# Patient Record
(demographics unavailable — no encounter records)

---

## 2017-07-16 NOTE — EDM.PDOC
ED HPI GENERAL MEDICAL PROBLEM





- General


Chief Complaint: Genitourinary Problem


Stated Complaint: BLOOD IN URNINE


Time Seen by Provider: 07/16/17 18:03


Source of Information: Reports: Patient


History Limitations: Reports: No Limitations





- History of Present Illness


INITIAL COMMENTS - FREE TEXT/NARRATIVE: 





64-year-old female presents for evaluation and treatment of dysuria, increased 

urinary frequency and hematuria. Patient reports that her symptoms started 

around noon today. She states that she gets urinary tract infections almost 

yearly. This feels similar to previous urinary tract infections. She reports 

around 2 PM she developed gross hematuria. She states that she is feeling fine 

otherwise. No fevers, chills, nausea, vomiting, back pain or flank pain. She is 

reporting some cramping in the suprapubic area pubic region.





No history of kidney problems.





Patient is currently on amoxicillin twice a day for dental infection. She 

states she about 5 days of this medication left.


Onset: Today





- Related Data


 Allergies











Allergy/AdvReac Type Severity Reaction Status Date / Time


 


No Known Allergies Allergy   Verified 10/27/15 00:13











Home Meds: 


 Home Meds





Escitalopram [Lexapro] 20 mg PO DAILY 10/27/15 [History]


Amoxicillin 500 mg PO TID 07/16/17 [History]


Cyanocobalamin (Vitamin B-12) [Vitamin B12] 2,500 mcg PO DAILY 07/16/17 [History

]


Ergocalciferol (Vitamin D2) [Vitamin D2] 2,000 units PO DAILY 07/16/17 [History]


Ferrous Sulfate 325 mg PO DAILY 07/16/17 [History]


Omeprazole Magnesium [Prilosec Otc] 20 mg PO DAILY 07/16/17 [History]


Phenazopyridine [Pyridium] 100 mg PO TID #9 tablet 07/16/17 [Rx]











Past Medical History


Genitourinary History: Reports: UTI, Recurrent


Other Musculoskeletal History: lower back pain, lumbar stenosis


Psychiatric History: Reports: Depression


Hematologic History: Reports: B12 Deficiency, Iron Deficiency





- Past Surgical History


GI Surgical History: Reports: Bariatric Procedure





Social & Family History





- Family History


Family Medical History: Noncontributory





- Tobacco Use


Smoking Status *Q: Never Smoker


Second Hand Smoke Exposure: No





- Alcohol Use


Days Per Week of Alcohol Use: 0





- Recreational Drug Use


Recreational Drug Use: No





ED ROS GENERAL





- Review of Systems


Review Of Systems: See Below


Constitutional: Denies: Fever


GI/Abdominal: Denies: Nausea, Vomiting


: Reports: Dysuria, Frequency, Hematuria, Urgency.  Denies: Flank Pain


Musculoskeletal: Denies: Back Pain





ED EXAM, RENAL/





- Physical Exam


Exam: See Below


Exam Limited By: No Limitations


General Appearance: Alert, WD/WN, No Apparent Distress


Respiratory/Chest: No Respiratory Distress


Neurological: Alert, Oriented, Normal Cognition


Psychiatric: Normal Affect, Normal Mood


Skin Exam: Warm, Dry, Normal Color





Course





- Vital Signs


Last Recorded V/S: 


 Last Vital Signs











Temp  36.6 C   07/16/17 16:51


 


Pulse  72   07/16/17 16:51


 


Resp  16   07/16/17 16:51


 


BP  151/79 H  07/16/17 16:51


 


Pulse Ox  98   07/16/17 16:51














- Orders/Labs/Meds


Orders: 


 Active Orders 24 hr











 Category Date Time Status


 


 CULTURE URINE [RM] Stat Lab  07/16/17 17:05 Received











Labs: 


 Laboratory Tests











  07/16/17 Range/Units





  17:05 


 


Urine Color  Red H  (Yellow)  


 


Urine Appearance  Cloudy H  (Clear)  


 


Urine pH  5.5  (5.0-8.0)  


 


Ur Specific Gravity  > or = 1.030  (1.005-1.030)  


 


Urine Protein  3+ H  (Negative)  


 


Urine Glucose (UA)  Negative  (Negative)  


 


Urine Ketones  Negative  (Negative)  


 


Urine Occult Blood  3+ H  (Negative)  


 


Urine Nitrite  Positive H  (Negative)  


 


Urine Bilirubin  1+ H  (Negative)  


 


Urine Urobilinogen  1.0  (0.2-1.0)  


 


Ur Leukocyte Esterase  1+ H  (Negative)  


 


Urine RBC  >100 H  (0-5)  /hpf


 


Urine WBC  10-20 H  (0-5)  /hpf


 


Ur Epithelial Cells  0-5  (0-5)  /hpf


 


Urine Bacteria  Moderate H  (FEW)  /hpf


 


Urine Mucus  Few  (FEW)  /hpf














- Re-Assessments/Exams


Free Text/Narrative Re-Assessment/Exam: 





07/16/17 18:04


UA has 3+ protein, 3+ blood, positive nitrates and 1+ leukocytes. Moderate 

bacteria seen on microscopy. Urine sent for culture.





I reviewed the UA results with the patient. I will start her on some Bactrim 

twice a day for 7 days. Follow-up as needed.





Return to the ER if symptoms change or worsen. Discharge instructions as 

documented.





Departure





- Departure


Time of Disposition: 18:04


Disposition: Home, Self-Care 01


Condition: Good


Clinical Impression: 


 UTI, Urinary tract infectious disease








- Discharge Information


Prescriptions: 


Phenazopyridine [Pyridium] 100 mg PO TID #9 tablet


Instructions:  Urinary Tract Infection, Adult


Referrals: 


Kalyn Ramos DO [Primary Care Provider] - 


Forms:  ED Department Discharge


Additional Instructions: 


Take the Pyridium 1 tablet 3 times a day. This medication  is for dysuria.





Over-the-counter Tylenol or Motrin as needed for additional pain relief.





Take the Septra 1 tab twice a day for 7 days as prescribed.





make sure you are drinking plenty of fluids.





Follow-up with your primary care provider if her symptoms do not improve in 7 

days.





Please return to the ER if your symptoms change or worsen.





- My Orders


Last 24 Hours: 


My Active Orders





07/16/17 17:05


CULTURE URINE [RM] Stat 














- Assessment/Plan


Last 24 Hours: 


My Active Orders





07/16/17 17:05


CULTURE URINE [RM] Stat